# Patient Record
Sex: FEMALE | Race: BLACK OR AFRICAN AMERICAN
[De-identification: names, ages, dates, MRNs, and addresses within clinical notes are randomized per-mention and may not be internally consistent; named-entity substitution may affect disease eponyms.]

---

## 2018-03-14 ENCOUNTER — HOSPITAL ENCOUNTER (EMERGENCY)
Dept: HOSPITAL 74 - JERFT | Age: 43
Discharge: HOME | End: 2018-03-14
Payer: COMMERCIAL

## 2018-03-14 VITALS — SYSTOLIC BLOOD PRESSURE: 158 MMHG | TEMPERATURE: 98.3 F | HEART RATE: 80 BPM | DIASTOLIC BLOOD PRESSURE: 90 MMHG

## 2018-03-14 VITALS — BODY MASS INDEX: 29.9 KG/M2

## 2018-03-14 DIAGNOSIS — I10: ICD-10-CM

## 2018-03-14 DIAGNOSIS — L73.2: Primary | ICD-10-CM

## 2018-03-14 NOTE — PDOC
History of Present Illness





- General


Chief Complaint: Abscess Boil


Stated Complaint: ABSCESS, PAIN


Time Seen by Provider: 03/14/18 08:49


History Source: Patient


Exam Limitations: No Limitations





- History of Present Illness


Initial Comments: 





03/14/18 09:29


Patient is a [43-year-old female, history of hypertension presents with 

persistent draining lesions under bilateral armpits and sporadic throughout the 

body. Patient states they come and go they're painful not associated with fever 

in 2016 was seen by a physician who gave her amoxicillin.  ]





Past Medical History: [Denies].  





Allergies: No known allergies





Medications: [Chlorothiazide]





Family History: Non-contributory





Social History: Denies smoking, alcohol use, or IVDU





Review of Systems


GENERAL/CONSTITUTIONAL: [No fever or chills. No weakness. No weight change.]


HEAD, EYES, EARS, NOSE AND THROAT: [No change in vision. No ear pain or 

discharge. No sore throat. ]


CARDIOVASCULAR: [No chest pain or shortness of breath.]


RESPIRATORY: [No cough, wheezing, or hemoptysis.]


GASTROINTESTINAL: [No nausea, vomiting, diarrhea or constipation. No rectal 

bleeding.]


GENITOURINARY: [No dysuria, frequency, or change in urination.]


MUSCULOSKELETAL: [No joint or muscle swelling or pain. No neck or back pain.]


SKIN : [No rash or easy bruising. Multiple draining pustulant drainage painful 

lesions under bilateral armpits and healed lesions generalized]


NEUROLOGIC: [No headache, vertigo, loss of consciousness, or loss of sensation.]


HEMATOLOGIC/LYMPHATIC: [No anemia, easy bleeding, or history of blood clots.]


ALLERGIC/IMMUNOLOGIC: [No hives or skin allergy. No latex allergy.]





Physical Exam: 


GENERAL: [The patient is awake, alert, and fully oriented, in no acute distress.

]


EYES: [Pupils equal, round and reactive to light, extraocular movements intact, 

sclera anicteric, conjunctiva clear.]


ENT: [Ears normal, nares patent, oropharynx clear without exudates.  Moist 

mucous membranes. No uvula deviation]


NECK: [Normal range of motion, supple without lymphadenopathy, JVD, or masses.]


LUNGS: [Breath sounds equal, clear to auscultation bilaterally.  No wheezes, 

and no crackles.]


HEART: [Regular rate and rhythm, normal S1 and S2 without murmur, rub or gallop.

]


ABDOMEN: [Soft, nontender, normoactive bowel sounds.  No guarding, no rebound.  

No masses. No bruising or abrasions]


MUSCULOSKELETAL: [Normal range of motion, no edema.  No clubbing or cyanosis. 

No cords, erythema, or tenderness. No CVA Tenderness with fist.]


NEUROLOGICAL: [Cranial nerves II through XII grossly intact.  Normal speech, 

normal gait.]


SKIN: [Warm, Dry, normal turgor, no rashes or lesions noted. Multiple sporadic 

draining painful lesions, more concentrated under bilateral axilla.  ]





Past History





- Past Medical History


Allergies/Adverse Reactions: 


 Allergies











Allergy/AdvReac Type Severity Reaction Status Date / Time


 


No Known Allergies Allergy   Verified 03/14/18 08:46











Home Medications: 


Ambulatory Orders





Chlorhexidine Gluconate [Hibiclens For Decolonization -] 1 applic TP DAILY #2 

bottle 03/14/18 


Hydrochlorothiazide [Hctz -] 25 mg PO DAILY 03/14/18 


Sulfamethoxazole/Trimethoprim [Bactrim Ds -] 1 tab PO BID #20 tablet 03/14/18 








COPD: No


HTN: Yes





- Suicide/Smoking/Psychosocial Hx


Smoking History: Never smoked


Hx Alcohol Use: No


Drug/Substance Use Hx: No


Substance Use Type: None





*Physical Exam





- Vital Signs


 Last Vital Signs











Temp Pulse Resp BP Pulse Ox


 


 98.3 F   80   18   158/90   100 


 


 03/14/18 08:43  03/14/18 08:43  03/14/18 08:43  03/14/18 08:43  03/14/18 08:43














Medical Decision Making





- Medical Decision Making





03/14/18 09:31


A/P: Patient with multiple draining lesions under bilateral axilla and sporadic 

the body which those are healing. Patient was given amoxicillin in the past 

which patient reports did nothing. Lesions are highly suspicious for MRSA. 

Wound culture to right axilla sent. I will start patient on Hibiclens to wash 

body daily and Bactrim, follow-up with dermatology today at 11 AM, Dr. Gallardo.


Follow-up with result in one week, and also Dr. Cisse from infectious 

disease.





*DC/Admit/Observation/Transfer


Diagnosis at time of Disposition: 


 Hidradenitis








- Discharge Dispostion


Disposition: HOME


Condition at time of disposition: Stable


Admit: No





- Prescriptions


Prescriptions: 


Chlorhexidine Gluconate [Hibiclens For Decolonization -] 1 applic TP DAILY #2 

bottle


Sulfamethoxazole/Trimethoprim [Bactrim Ds -] 1 tab PO BID #20 tablet





- Referrals


Referrals: 


Rocky Barkley [Primary Care Provider] - 


Isidro Gallardo [Non Staff, Medical] - 


Jojo Cisse MD [Staff Physician] - 





- Patient Instructions


Additional Instructions: 


Maintain good hand and body hygiene. ...


Keep cuts, scrapes and wounds clean and covered until healed.


Avoid sharing personal items such as towels and razors.


Get care early if you think you might have an infection.





Dry sheets on the warmest setting possible. Bathe in chlorhexidine (HIBICLENS) 

soap or bath water with a small amount of liquid bleach, usually about 1 

teaspoon for every gallon of bathwater. Both of these interventions can be used 

to rid the skin of MRSA














- Post Discharge Activity


Forms/Work/School Notes:  Back to Work

## 2023-08-19 ENCOUNTER — HOSPITAL ENCOUNTER (EMERGENCY)
Dept: HOSPITAL 74 - JERFT | Age: 48
Discharge: HOME | End: 2023-08-19
Payer: COMMERCIAL

## 2023-08-19 VITALS — DIASTOLIC BLOOD PRESSURE: 85 MMHG | TEMPERATURE: 98.5 F | RESPIRATION RATE: 20 BRPM | SYSTOLIC BLOOD PRESSURE: 125 MMHG

## 2023-08-19 VITALS — HEART RATE: 72 BPM

## 2023-08-19 VITALS — BODY MASS INDEX: 32.4 KG/M2

## 2023-08-19 DIAGNOSIS — R09.81: ICD-10-CM

## 2023-08-19 DIAGNOSIS — M79.10: Primary | ICD-10-CM

## 2023-08-19 DIAGNOSIS — U07.1: ICD-10-CM
